# Patient Record
(demographics unavailable — no encounter records)

---

## 2024-11-04 NOTE — PHYSICAL EXAM
[] : positive [place] [TWNoteComboBox7] : active forward flexion 160 degrees [de-identified] : external rotation at 90 degrees of abduction 90 degrees [TWNoteComboBox5] : internal rotation at 90 degrees of abduction 80 degrees

## 2024-11-04 NOTE — ASSESSMENT
[FreeTextEntry1] : Xrays reviewed with patient Treatment options discussed  MRI left shoulder to eval labral tear Ibuprofen 800 sent for pain and inflammation Follow up with shoulder specialist to review MRI 
No

## 2024-11-04 NOTE — HISTORY OF PRESENT ILLNESS
[de-identified] : 11.4.24 Patient here for left shoulder pain for 3 weeks. Patient states she fell, she had no immediate pain. Pain started next day and had gradually worsened. Patient right hand is her dominant hand. Taking tylenol as needed for pain. Pain is worse with movement. No previous injuries or surgeries to left shoulder. [de-identified] : movement